# Patient Record
Sex: FEMALE | Race: BLACK OR AFRICAN AMERICAN | URBAN - METROPOLITAN AREA
[De-identification: names, ages, dates, MRNs, and addresses within clinical notes are randomized per-mention and may not be internally consistent; named-entity substitution may affect disease eponyms.]

---

## 2020-08-19 ENCOUNTER — OFFICE VISIT (OUTPATIENT)
Dept: SURGERY | Age: 35
End: 2020-08-19
Payer: COMMERCIAL

## 2020-08-19 VITALS
WEIGHT: 264 LBS | RESPIRATION RATE: 16 BRPM | HEIGHT: 68 IN | BODY MASS INDEX: 40.01 KG/M2 | TEMPERATURE: 97.2 F | OXYGEN SATURATION: 99 % | SYSTOLIC BLOOD PRESSURE: 132 MMHG | HEART RATE: 90 BPM | DIASTOLIC BLOOD PRESSURE: 82 MMHG

## 2020-08-19 PROCEDURE — 99203 OFFICE O/P NEW LOW 30 MIN: CPT | Performed by: SURGERY

## 2020-08-19 NOTE — PROGRESS NOTES
[facility]    CC: Symptomatic macromastia    REFERRING PHYSICIAN: Previous patient    HPI: This is a 38 y.o.  female who presents to clinic with desire for breast reduction. She noted that she gained approximately 50 lbs and her breasts grew. She then was able to decrease her weight and has had persistent problems with large breasts. Her pertinent breast history include the following:    Last Mammogram: Several years ago    Current bra size: 40H  Desired bra size: D/DD  Pregnancies/miscarriages:   Breast feeding: no future plans    Breast Symptoms:    Macromastia Symptoms:  Upper back pain: Yes      Bra strap grooves: Yes      Wears supportive bras (>1 yr): Yes      Tried conservative measures (PT, MDs,etc): Yes (PCP)      Intertrigo: Yes      Head/neck pain: Yes      Headaches: Yes      Paresthesias of hands/fingers: No      PMHx: Season allergies and eczema     PSHx:   Past Surgical History:   Procedure Laterality Date    BREAST LUMPECTOMY       ALLERGIES:   Allergies   Allergen Reactions    Pcn [Penicillins]      SOCIAL: No tobacco, occ ETOH, no IVD  FHx: Past history of breast CA: No   Past family members with breast reduction: No   Past family members with breast augmentation:No    Meds:   Current Outpatient Medications   Medication Sig Dispense Refill    ibuprofen (ADVIL;MOTRIN) 600 MG tablet Take 1 tablet by mouth every 6 hours as needed for Pain 30 tablet 0     No current facility-administered medications for this visit. ROS   Constitutional: Negative for chills and fever. HENT: Negative for congestion, facial swelling, and voice change. Eyes: Negative for photophobia and visual disturbance. Respiratory: Negative for apnea, cough, chest tightness and shortness of breath. Cardiovascular: Negative for chest pain and palpitations. Gastrointestinal: Negative for dysphagia and early satiety.   Genitourinary: Negative for difficulty urinating, dysuria, flank pain, frequency and hematuria. Musculoskeletal: Negative for new gait problem, joint swelling and myalgias. Skin: Negative for color change, pallor and rash. Endocrine: negative for tremors, temperature intolerance or polydipsia. Allergic/Immunologic: Negative for new environmental or food allergies. Neurological: Negative for dizziness, seizures, speech difficulty, numbness. Hematological: Negative for adenopathy. Psychiatric/Behavioral: Negative for agitation and confusion. EXAM     /82   Pulse 90   Temp 97.2 °F (36.2 °C)   Resp 16   Ht 5' 8\" (1.727 m)   Wt 264 lb (119.7 kg)   SpO2 99%   BMI 40.14 kg/m²     GEN: NAD, pleasant, obese  CVS: RRR  PULM: No respiratory distress  HEENT: PERRLA/EOMI; dentition (wearing mask), hearing appears within normal limits  NECK: Supple with trachea in midline, no masses  EXT: No lymphedema noted  ABD: soft/NT/obese  NEURO: No focal deficits, no obvious CN deficits  BACK: Bilateral latiss muscle intact  BREAST: Right larger than Left   R  Ptosis ndgndrndanddndend:nd nd2nd Palpable masses: No     Nipple retraction: No     Palpable axillary lymphadenopathy: No     SN-N: 36.9 cm     N-IMF: 13.4 cm     Breast width: 16.3 cm     Healed superior periareolar scar on the right         L  Ptosis ndgndrndanddndend:nd nd2nd Palpable masses: No     Nipple retraction: No     Palpable axillary lymphadenopathy: No     SN-N: 38 cm     N-IMF: 14 cm     Breast width: 16.4 cm      Estimated Reduction Volume (Schnur scale): 1070 grams (each)    RADIOLOGY: None    IMP: 38 y.o. female with symptomatic macromastia  PLAN:Would benefit from breast reduction. However, needs to decrease her weight to decrease her risk of complication. If she loses weight and still has symptoms, will submit to insurance and schedule. A discussion regarding surgical options including: reduction mammaplasty was performed with the patient.  Her symptoms of macromastia were discussed in detail and that surgical intervention is focused primarily on relieving upper torso complaints. Clinical photos were obtained. Additionally,discussion regarding the risks including, but not limited to: bleeding (potentially requiring transfusion or reoperation), infection, seroma, reoperation, poor cosmetic outcome, scarring, revisional surgery, nipple loss/complication, nipple malposition, diminished sensation, inability to breastfeed, VTE (DVT/PE), and death was performed. All questions were answered in a satisfactory manner. The patient was counseled at length about the risks of dinesh Covid-19 during their perioperative period and any recovery window from their procedure. The patient was made aware that dinesh Covid-19  may worsen their prognosis for recovering from their procedure  and lend to a higher morbidity and/or mortality risk. All material risks, benefits, and reasonable alternatives including postponing the procedure were discussed. The patient does wish to proceed with the procedure at this time.     Truman Cruz MD  [facility]  08/19/20

## 2020-09-01 ENCOUNTER — NURSE ONLY (OUTPATIENT)
Dept: SURGERY | Age: 35
End: 2020-09-01

## 2020-09-01 NOTE — PROGRESS NOTES
[facility]  Post-Op Visit    Patient: Jeff Ellis  YOB: 1985    HPI: Jeff Ellis is a 38 y.o. female who is here today to look at [facility] and discuss BBR. Pt will continue to work to lower BMI and follow up with Dr. Dima Lee    Chief Complaint   Patient presents with    Other     look at [address] [place] [address]: No follow-ups on file.

## 2024-04-10 ENCOUNTER — TELEPHONE (OUTPATIENT)
Dept: SURGERY | Age: 39
End: 2024-04-10

## 2024-04-10 NOTE — TELEPHONE ENCOUNTER
Patient would like to know what is the BMI requirement for a bilateral breast reduction.     Patient can be reached at [phone].

## 2024-04-10 NOTE — TELEPHONE ENCOUNTER
I returned the patients call mentioned below. The patient is aware that the BMI requirement for a BBR with Dr. Villanueva is a minimum of 35.     I will close this phone note.

## 2024-06-19 ENCOUNTER — TELEPHONE (OUTPATIENT)
Dept: SURGERY | Age: 39
End: 2024-06-19

## 2024-06-19 NOTE — TELEPHONE ENCOUNTER
I returned the patients call mentioned below. We discussed the insurance pre certification process and she is aware that once approval is received we are currently scheduling surgery 5-6 weeks out. The patient will keep her appointment.     I will close this phone note.

## 2024-06-19 NOTE — TELEPHONE ENCOUNTER
Patient is scheduled for a new patient consult with Salma Su on 07/16/2024 for a BBR. Patient states she was seeing another surgeon for the BBR and already received prior authorization for the surgery, but the surgeon is going on maternity leave and patient was given an estimate for surgery as November or December. Patient states she does not want to wait that long. Patient would like to know the estimated timeline for a surgery with Dr. Villauneva.    Patient can be reached at [phone].

## 2024-12-10 ENCOUNTER — OFFICE VISIT (OUTPATIENT)
Age: 39
End: 2024-12-10

## 2024-12-10 VITALS
HEART RATE: 74 BPM | SYSTOLIC BLOOD PRESSURE: 128 MMHG | OXYGEN SATURATION: 97 % | HEIGHT: 68 IN | RESPIRATION RATE: 16 BRPM | DIASTOLIC BLOOD PRESSURE: 88 MMHG | WEIGHT: 245 LBS | TEMPERATURE: 98.4 F | BODY MASS INDEX: 37.13 KG/M2

## 2024-12-10 DIAGNOSIS — Z01.818 PRE-OPERATIVE GENERAL PHYSICAL EXAMINATION: Primary | ICD-10-CM

## 2024-12-10 LAB
BILIRUBIN, POC: NORMAL
BLOOD URINE, POC: NORMAL
CLARITY, POC: CLEAR
COLOR, POC: YELLOW
GLUCOSE URINE, POC: NORMAL MG/DL
KETONES, POC: NORMAL MG/DL
LEUKOCYTE EST, POC: NORMAL
NITRITE, POC: NORMAL
PH, POC: 8.5
PROTEIN, POC: NORMAL MG/DL
SPECIFIC GRAVITY, POC: 1.01
UROBILINOGEN, POC: 0.2 MG/DL

## 2024-12-10 RX ORDER — SEMAGLUTIDE 2.4 MG/.75ML
INJECTION, SOLUTION SUBCUTANEOUS
COMMUNITY

## 2024-12-10 RX ORDER — HYDROCORTISONE 25 MG/G
OINTMENT TOPICAL
COMMUNITY

## 2024-12-10 RX ORDER — MINERAL OIL/HYDROPHIL PETROLAT
OINTMENT (GRAM) TOPICAL
COMMUNITY

## 2024-12-10 RX ORDER — TOPIRAMATE 50 MG/1
50 TABLET, FILM COATED ORAL 2 TIMES DAILY
COMMUNITY

## 2024-12-10 ASSESSMENT — ENCOUNTER SYMPTOMS
RHINORRHEA: 0
ABDOMINAL PAIN: 0
WHEEZING: 0
DIARRHEA: 0
SORE THROAT: 0
SHORTNESS OF BREATH: 0
VOMITING: 0
BACK PAIN: 0
COUGH: 0
NAUSEA: 0

## 2024-12-10 NOTE — PROGRESS NOTES
Jacinta Matthew (:  1985) is a 39 y.o. female,New patient, here for evaluation of the following chief complaint(s):  preop physical  (Surgery is scheduled for 2024 lipo 360 )      Assessment & Plan :  Visit Diagnoses and Associated Orders       Pre-operative general physical examination    -  Primary    EKG 12 Lead [78449 Custom]      XR CHEST STANDARD (2 VW) [40487 CPT(R)]      POCT Urinalysis no Micro [41458 Custom]           ORDERS WITHOUT AN ASSOCIATED DIAGNOSIS    Cetirizine HCl 10 MG CAPS [501627]      mineral oil-hydrophilic petrolatum (AQUAPHOR) ointment [612]      hydrocortisone 2.5 % ointment [3732]      WEGOVY 2.4 MG/0.75ML SOAJ SC injection [063141]      topiramate (TOPAMAX) 50 MG tablet [12920]            Results for orders placed or performed in visit on 12/10/24   POCT Urinalysis no Micro   Result Value Ref Range    Color, UA yellow     Clarity, UA clear     Glucose, UA POC neg mg/dL    Bilirubin, UA neg     Ketones, UA neg mg/dL    Spec Grav, UA 1.015     Blood, UA POC trace     pH, UA 8.5     Protein, UA POC neg mg/dL    Urobilinogen, UA 0.2 mg/dL    Leukocytes, UA neg     Nitrite, UA neg        Plan: UA today is negative for leukocytes and nitrates. A chest x-ray was done today in the office. The patient was sent to our sister facility to have the EKG done. The EKG showed normal sinus rhythm. Her chest x-ray is pending. She was medically cleared for surgery. She was advised to follow-up with her PCP as needed. Return precautions discussed.      Subjective :  HPI  Jacinta Matthew is a 39 y.o. female who presents for a pre-op physical. She is having lipo 360 done in 9 days. She denies any significant medical history besides allergies. She states that she takes zyrtec as needed otherwise no other prescribed medications. She denies any pain at this time. She denies any problems with anesthesia in the past.        Vitals:    12/10/24 1742   BP: 128/88   Pulse: 74   Resp: 16   Temp: 98.4 °F